# Patient Record
(demographics unavailable — no encounter records)

---

## 2024-10-07 NOTE — END OF VISIT
[FreeTextEntry3] : I, Dr. Choudhury personally performed the evaluation and management (E/M) services including all necessary procedures, for this new patient. That E/M includes conducting the clinically appropriate initial history &/or exam, assessing all conditions, and establishing the plan of care. Today, my HARRIETT, Brunilda Jacobsen, was here to observe &/or participate in the visit & follow plan of care established by me.

## 2024-10-07 NOTE — HISTORY OF PRESENT ILLNESS
[de-identified] : Patient comes in with some concerns for hearing. She denies pain in the ears or ringing in the ears. She does have some issues hearing on occasion. She does not have any nasal congestion or runny nose issues

## 2024-10-07 NOTE — ASSESSMENT
[FreeTextEntry1] : Patient 85-year-old female here for diminished hearing has no cerumen impaction bilaterally rest of examination appears to be normal audiogram shows presbycusis hearing loss she is certainly cleared for hearing aid if indicated.